# Patient Record
Sex: MALE | Race: ASIAN | NOT HISPANIC OR LATINO | URBAN - METROPOLITAN AREA
[De-identification: names, ages, dates, MRNs, and addresses within clinical notes are randomized per-mention and may not be internally consistent; named-entity substitution may affect disease eponyms.]

---

## 2017-05-26 ENCOUNTER — OUTPATIENT (OUTPATIENT)
Dept: OUTPATIENT SERVICES | Facility: HOSPITAL | Age: 72
LOS: 1 days | End: 2017-05-26
Payer: MEDICARE

## 2017-05-26 PROCEDURE — 71250 CT THORAX DX C-: CPT

## 2017-05-26 PROCEDURE — 71250 CT THORAX DX C-: CPT | Mod: 26

## 2019-03-17 PROBLEM — Z00.00 ENCOUNTER FOR PREVENTIVE HEALTH EXAMINATION: Status: ACTIVE | Noted: 2019-03-17

## 2019-04-16 ENCOUNTER — APPOINTMENT (OUTPATIENT)
Dept: PULMONOLOGY | Facility: CLINIC | Age: 74
End: 2019-04-16

## 2019-06-03 ENCOUNTER — APPOINTMENT (OUTPATIENT)
Dept: PULMONOLOGY | Facility: CLINIC | Age: 74
End: 2019-06-03
Payer: MEDICARE

## 2019-06-03 VITALS
TEMPERATURE: 98.1 F | DIASTOLIC BLOOD PRESSURE: 70 MMHG | SYSTOLIC BLOOD PRESSURE: 116 MMHG | HEIGHT: 68 IN | BODY MASS INDEX: 20 KG/M2 | HEART RATE: 90 BPM | WEIGHT: 132 LBS | OXYGEN SATURATION: 98 %

## 2019-06-03 DIAGNOSIS — Z85.038 PERSONAL HISTORY OF OTHER MALIGNANT NEOPLASM OF LARGE INTESTINE: ICD-10-CM

## 2019-06-03 DIAGNOSIS — F17.200 NICOTINE DEPENDENCE, UNSPECIFIED, UNCOMPLICATED: ICD-10-CM

## 2019-06-03 PROCEDURE — 99204 OFFICE O/P NEW MOD 45 MIN: CPT | Mod: 25

## 2019-06-03 PROCEDURE — 94010 BREATHING CAPACITY TEST: CPT

## 2019-06-03 RX ORDER — CLOBETASOL PROPIONATE 0.5 MG/G
0.05 CREAM TOPICAL
Qty: 60 | Refills: 0 | Status: ACTIVE | COMMUNITY
Start: 2019-05-21

## 2019-06-03 NOTE — HISTORY OF PRESENT ILLNESS
[FreeTextEntry1] : 06/03/2019: Asked to evaluate patient by Dr Haskins for COPD. He smokes 7-8 cigs a day down from 1 ppd. He denies dyspnea or cough though he does endorse producing yellow mucus. Retired from [employer]. Exercising less since alf but can walk fine. His wife has lung cancer.\par

## 2019-06-03 NOTE — ASSESSMENT
[FreeTextEntry1] : Data reviewed:\par \par CT chest [facility] 5/2017 personally reviewed: emphysema, no nodules\par PA/lat CXR [facility] 2/2018 personally reviewed: clear lungs\par \par [place] 06/03/2019 : normal\par \par Impression:\par Emphysema without airflow obstruction\par Tobacco dependence, qualified for LDCT\par \par Plan:\par Reviewed diagnosis of emphysema with him and absence of treatment for emphysema.\par Recommend smoking cessation - not motivated at this time.\par Discussed LDCT. Doesn't want to pursue at this time but may wish to in future. Dr Haskins can order if so, or he can return to me if preferred.

## 2019-06-03 NOTE — PHYSICAL EXAM
[General Appearance - Well Developed] : well developed [General Appearance - In No Acute Distress] : no acute distress [General Appearance - Well Nourished] : well nourished [Normal Conjunctiva] : the conjunctiva exhibited no abnormalities [Heart Rate And Rhythm] : heart rate and rhythm were normal [Normal Oropharynx] : normal oropharynx [Murmurs] : no murmurs present [Heart Sounds] : normal S1 and S2 [Edema] : no peripheral edema present [Abdomen Soft] : soft [Bowel Sounds] : normal bowel sounds [Auscultation Breath Sounds / Voice Sounds] : lungs were clear to auscultation bilaterally [FreeTextEntry1] : colostomy [Nail Clubbing] : no clubbing of the fingernails [Abdomen Tenderness] : non-tender [Affect] : the affect was normal [] : no rash [Cyanosis, Localized] : no localized cyanosis

## 2019-06-03 NOTE — CONSULT LETTER
[Dear  ___] : Dear  [unfilled], [( Thank you for referring [unfilled] for consultation for _____ )] : Thank you for referring [unfilled] for consultation for [unfilled] [Consult Closing:] : Thank you very much for allowing me to participate in the care of this patient.  If you have any questions, please do not hesitate to contact me. [Please see my note below.] : Please see my note below. [FreeTextEntry2] : Ari Haskins MD\par [address] \[place] [address] [FreeTextEntry3] : Eliana Whyte MD, FCCP\par  [Sincerely,] : Sincerely,

## 2019-11-11 ENCOUNTER — APPOINTMENT (OUTPATIENT)
Dept: PULMONOLOGY | Facility: CLINIC | Age: 74
End: 2019-11-11
Payer: MEDICARE

## 2019-11-11 VITALS
DIASTOLIC BLOOD PRESSURE: 80 MMHG | SYSTOLIC BLOOD PRESSURE: 150 MMHG | TEMPERATURE: 98.1 F | HEART RATE: 84 BPM | OXYGEN SATURATION: 98 % | HEIGHT: 68 IN

## 2019-11-11 DIAGNOSIS — J43.9 EMPHYSEMA, UNSPECIFIED: ICD-10-CM

## 2019-11-11 PROCEDURE — 99213 OFFICE O/P EST LOW 20 MIN: CPT | Mod: 25

## 2019-11-11 PROCEDURE — 94010 BREATHING CAPACITY TEST: CPT

## 2019-11-13 ENCOUNTER — APPOINTMENT (OUTPATIENT)
Dept: PULMONOLOGY | Facility: CLINIC | Age: 74
End: 2019-11-13
Payer: MEDICARE

## 2019-11-13 PROCEDURE — 94729 DIFFUSING CAPACITY: CPT

## 2019-11-13 PROCEDURE — 94060 EVALUATION OF WHEEZING: CPT

## 2019-11-13 PROCEDURE — 94727 GAS DIL/WSHOT DETER LNG VOL: CPT

## 2019-11-13 NOTE — ASSESSMENT
[FreeTextEntry1] : Data reviewed:\par \par CT chest [facility] 5/2017: emphysema, no nodules\par PA/lat CXR [facility] 2/2018: clear lungs\par \par [place] 06/03/2019 : normal\par [place] 11/11/19: normal\par \par Impression:\par Emphysema without airflow obstruction\par Tobacco dependence, qualified for LDCT\par \par Plan:\par Medically he does not need a post bronchodilator spirometry as his pre bronchodilator spirometry is normal and he does not have COPD. But can return for full PFT to enroll in 9/11 program if necessary.\par Has declined LDCT in the past.\par Not interested in smoking cessation.\par --\par PFT 11/13/19: entirely normal\par He consents for us to send the report to the 9/11 program if they request it.

## 2019-11-13 NOTE — HISTORY OF PRESENT ILLNESS
[FreeTextEntry1] : 06/03/2019: Asked to evaluate patient by Dr Haskins for COPD. He smokes 7-8 cigs a day down from 1 ppd. He denies dyspnea or cough though he does endorse producing yellow mucus. Retired from city [employer]. Exercising less since jail but can walk fine. His wife has lung cancer.\par \par 11/11/19: Since seeing me he enrolled in 9/11 [facility] because of colon cancer. It sounds like he is back because they want him to have a full PFT - they told him he needs a "post-test" and he only had the "pre-test." He continues to smoke 7-8 cigs a day because of anxiety and the odor around changing his ostomy bag. Is otherwise well.

## 2025-05-01 ENCOUNTER — APPOINTMENT (OUTPATIENT)
Dept: HEART AND VASCULAR | Facility: CLINIC | Age: 80
End: 2025-05-01

## 2025-05-01 ENCOUNTER — NON-APPOINTMENT (OUTPATIENT)
Age: 80
End: 2025-05-01

## 2025-05-01 VITALS
DIASTOLIC BLOOD PRESSURE: 71 MMHG | OXYGEN SATURATION: 99 % | SYSTOLIC BLOOD PRESSURE: 147 MMHG | WEIGHT: 115.01 LBS | BODY MASS INDEX: 19.16 KG/M2 | HEART RATE: 80 BPM | HEIGHT: 65 IN

## 2025-05-01 DIAGNOSIS — I70.0 ATHEROSCLEROSIS OF AORTA: ICD-10-CM

## 2025-05-01 DIAGNOSIS — R06.00 DYSPNEA, UNSPECIFIED: ICD-10-CM

## 2025-05-01 DIAGNOSIS — I25.10 ATHEROSCLEROTIC HEART DISEASE OF NATIVE CORONARY ARTERY W/OUT ANGINA PECTORIS: ICD-10-CM

## 2025-05-01 PROCEDURE — 93000 ELECTROCARDIOGRAM COMPLETE: CPT

## 2025-05-01 PROCEDURE — 99406 BEHAV CHNG SMOKING 3-10 MIN: CPT

## 2025-05-01 PROCEDURE — 99204 OFFICE O/P NEW MOD 45 MIN: CPT | Mod: 25

## 2025-05-01 RX ORDER — ATORVASTATIN CALCIUM 40 MG/1
40 TABLET, FILM COATED ORAL
Refills: 0 | Status: ACTIVE | COMMUNITY

## 2025-06-06 ENCOUNTER — OUTPATIENT (OUTPATIENT)
Dept: OUTPATIENT SERVICES | Facility: HOSPITAL | Age: 80
LOS: 1 days | End: 2025-06-06

## 2025-06-06 ENCOUNTER — RESULT REVIEW (OUTPATIENT)
Age: 80
End: 2025-06-06

## 2025-06-06 ENCOUNTER — APPOINTMENT (OUTPATIENT)
Dept: CT IMAGING | Facility: CLINIC | Age: 80
End: 2025-06-06

## 2025-06-06 PROCEDURE — 75574 CT ANGIO HRT W/3D IMAGE: CPT | Mod: 26

## 2025-06-06 PROCEDURE — 75580 N-INVAS EST C FFR SW ALY CTA: CPT | Mod: 26
